# Patient Record
Sex: MALE | Race: WHITE | NOT HISPANIC OR LATINO | Employment: OTHER | URBAN - METROPOLITAN AREA
[De-identification: names, ages, dates, MRNs, and addresses within clinical notes are randomized per-mention and may not be internally consistent; named-entity substitution may affect disease eponyms.]

---

## 2018-12-12 ENCOUNTER — TRANSFERRED RECORDS (OUTPATIENT)
Dept: HEALTH INFORMATION MANAGEMENT | Facility: CLINIC | Age: 63
End: 2018-12-12

## 2019-09-21 ENCOUNTER — TRANSFERRED RECORDS (OUTPATIENT)
Dept: HEALTH INFORMATION MANAGEMENT | Facility: CLINIC | Age: 64
End: 2019-09-21

## 2019-10-10 ENCOUNTER — TRANSFERRED RECORDS (OUTPATIENT)
Dept: HEALTH INFORMATION MANAGEMENT | Facility: CLINIC | Age: 64
End: 2019-10-10

## 2019-10-16 ENCOUNTER — TRANSFERRED RECORDS (OUTPATIENT)
Dept: HEALTH INFORMATION MANAGEMENT | Facility: CLINIC | Age: 64
End: 2019-10-16

## 2019-10-17 ENCOUNTER — TRANSFERRED RECORDS (OUTPATIENT)
Dept: HEALTH INFORMATION MANAGEMENT | Facility: CLINIC | Age: 64
End: 2019-10-17

## 2019-10-25 ENCOUNTER — TRANSFERRED RECORDS (OUTPATIENT)
Dept: HEALTH INFORMATION MANAGEMENT | Facility: CLINIC | Age: 64
End: 2019-10-25

## 2019-11-01 ENCOUNTER — TRANSFERRED RECORDS (OUTPATIENT)
Dept: HEALTH INFORMATION MANAGEMENT | Facility: CLINIC | Age: 64
End: 2019-11-01

## 2019-11-08 ENCOUNTER — TRANSFERRED RECORDS (OUTPATIENT)
Dept: HEALTH INFORMATION MANAGEMENT | Facility: CLINIC | Age: 64
End: 2019-11-08

## 2019-12-13 ENCOUNTER — TRANSFERRED RECORDS (OUTPATIENT)
Dept: HEALTH INFORMATION MANAGEMENT | Facility: CLINIC | Age: 64
End: 2019-12-13

## 2020-03-03 ENCOUNTER — TRANSFERRED RECORDS (OUTPATIENT)
Dept: HEALTH INFORMATION MANAGEMENT | Facility: CLINIC | Age: 65
End: 2020-03-03

## 2020-06-05 ENCOUNTER — TRANSFERRED RECORDS (OUTPATIENT)
Dept: HEALTH INFORMATION MANAGEMENT | Facility: CLINIC | Age: 65
End: 2020-06-05

## 2020-06-16 ENCOUNTER — TRANSFERRED RECORDS (OUTPATIENT)
Dept: HEALTH INFORMATION MANAGEMENT | Facility: CLINIC | Age: 65
End: 2020-06-16

## 2020-07-15 ENCOUNTER — TRANSFERRED RECORDS (OUTPATIENT)
Dept: HEALTH INFORMATION MANAGEMENT | Facility: CLINIC | Age: 65
End: 2020-07-15

## 2020-10-16 ENCOUNTER — TRANSFERRED RECORDS (OUTPATIENT)
Dept: HEALTH INFORMATION MANAGEMENT | Facility: CLINIC | Age: 65
End: 2020-10-16

## 2020-11-09 ENCOUNTER — TRANSFERRED RECORDS (OUTPATIENT)
Dept: HEALTH INFORMATION MANAGEMENT | Facility: CLINIC | Age: 65
End: 2020-11-09

## 2020-11-14 ENCOUNTER — TRANSFERRED RECORDS (OUTPATIENT)
Dept: HEALTH INFORMATION MANAGEMENT | Facility: CLINIC | Age: 65
End: 2020-11-14

## 2020-11-16 ENCOUNTER — TRANSFERRED RECORDS (OUTPATIENT)
Dept: HEALTH INFORMATION MANAGEMENT | Facility: CLINIC | Age: 65
End: 2020-11-16

## 2021-02-01 ENCOUNTER — TRANSFERRED RECORDS (OUTPATIENT)
Dept: HEALTH INFORMATION MANAGEMENT | Facility: CLINIC | Age: 66
End: 2021-02-01

## 2021-02-02 ENCOUNTER — TRANSFERRED RECORDS (OUTPATIENT)
Dept: HEALTH INFORMATION MANAGEMENT | Facility: CLINIC | Age: 66
End: 2021-02-02

## 2021-02-03 ENCOUNTER — TRANSFERRED RECORDS (OUTPATIENT)
Dept: HEALTH INFORMATION MANAGEMENT | Facility: CLINIC | Age: 66
End: 2021-02-03

## 2021-02-04 ENCOUNTER — TRANSFERRED RECORDS (OUTPATIENT)
Dept: HEALTH INFORMATION MANAGEMENT | Facility: CLINIC | Age: 66
End: 2021-02-04

## 2021-02-05 ENCOUNTER — TRANSFERRED RECORDS (OUTPATIENT)
Dept: HEALTH INFORMATION MANAGEMENT | Facility: CLINIC | Age: 66
End: 2021-02-05

## 2021-02-16 ENCOUNTER — TRANSFERRED RECORDS (OUTPATIENT)
Dept: HEALTH INFORMATION MANAGEMENT | Facility: CLINIC | Age: 66
End: 2021-02-16

## 2021-04-29 ENCOUNTER — TRANSFERRED RECORDS (OUTPATIENT)
Dept: HEALTH INFORMATION MANAGEMENT | Facility: CLINIC | Age: 66
End: 2021-04-29

## 2021-05-17 ENCOUNTER — TRANSFERRED RECORDS (OUTPATIENT)
Dept: HEALTH INFORMATION MANAGEMENT | Facility: CLINIC | Age: 66
End: 2021-05-17

## 2021-07-27 ENCOUNTER — TRANSFERRED RECORDS (OUTPATIENT)
Dept: HEALTH INFORMATION MANAGEMENT | Facility: CLINIC | Age: 66
End: 2021-07-27

## 2021-08-05 ENCOUNTER — TRANSFERRED RECORDS (OUTPATIENT)
Dept: HEALTH INFORMATION MANAGEMENT | Facility: CLINIC | Age: 66
End: 2021-08-05

## 2021-09-13 ENCOUNTER — TRANSCRIBE ORDERS (OUTPATIENT)
Dept: OTHER | Age: 66
End: 2021-09-13

## 2021-09-13 DIAGNOSIS — N20.0 BILATERAL NEPHROLITHIASIS: Primary | ICD-10-CM

## 2021-09-13 DIAGNOSIS — N20.0 CALCULUS OF KIDNEY: ICD-10-CM

## 2021-09-13 DIAGNOSIS — N13.5 URETERAL STRICTURE: ICD-10-CM

## 2021-09-13 DIAGNOSIS — N13.5 CROSSING VESSEL AND STRICTURE OF URETER WITHOUT HYDRONEPHROSIS: ICD-10-CM

## 2021-10-05 ENCOUNTER — TELEPHONE (OUTPATIENT)
Dept: UROLOGY | Facility: CLINIC | Age: 66
End: 2021-10-05

## 2021-10-11 NOTE — TELEPHONE ENCOUNTER
MEDICAL RECORDS REQUEST   [facility]  Urology Clinic  [address] [place] [address]  PHONE: [phone]  Fax: [phone]        FUTURE VISIT INFORMATION                                                   Theodore Mnan, : 1955 scheduled for future visit at [facility]    APPOINTMENT INFORMATION:    Date: 2021    Provider:  Alexx Mtz MD    Reason for Visit/Diagnosis: biltateral nephrolithiasis, ureteral stricture, calculus of kidney, crossing vessel and stricture of ureter without hydronephrosis, records at [facility], scheduled per pt    REFERRAL INFORMATION:    Referring provider:  Mague Vargas    Specialty: n/a    Referring providers clinic:  [facility] contact number:  n/a    RECORDS REQUESTED FOR VISIT                                                     NOTES  STATUS/DETAILS   OFFICE NOTE from referring provider  Yes, records from [facility] in Media tab   OFFICE NOTE from other specialist  yes   DISCHARGE SUMMARY from hospital  no   DISCHARGE REPORT from the ER  no   OPERATIVE REPORT  yes   MEDICATION LIST  yes   LABS     URINALYSIS (UA)  yes   URINE CYTOLOGY  no   KIDNEY STONE     CT STONE  no   IMAGING (IMAGES & REPORT)  Received, 2019, 10/17/2019, 2019, 2020 -- CT ABD PEL   KUB  no   URETHRAL STRICTURE     RUG (IMAGES & REPORT)  no   VCUG  (IMAGES & REPORT)  no     PRE-VISIT CHECKLIST      Record collection complete If no, please explain   Request sent for recs and images -  amay    Appointment appropriately scheduled           (right time/right provider) Yes   Joint diagnostic appointment coordinated correctly          (ensure right order & amount of time) N/A   MyChart activation No   Questionnaire complete No     Records Requested  10/11/21    Facility  [place] medical records   Phone: [phone]  Fax: [phone]  Email: [email or web address]   Outcome Some recs in  Care  everywhere, sent a fax for images - Amay      Records Requested  10/11/21    Facility   [facility] [id number] [place] [address] [phone]     Med recs fx. Fax: [phone]  Med recs ph. [phone]     Outcome Some recs in  Care everywhere, sent a fax for images - Amay      Records Requested  10/11/21    Facility  [facility]  Medical Records  [address] [address]  Phone: [phone]  Fax: [phone]     Outcome Per appointment notes, patient has records at [facility], sent a fax for recs and images - Amay      Action 10/11/2021 JTV 9:29pm   Action Taken CSS received records from [facility]. Records sent to Scanning for processing.     @9:53pm, CSS [facility] Reports sent to scanning for processing. [facility] will send images via mail.      Action 10/12/2021 JTV 11:56am   Action Taken CSS received CD with images from [facility]. CD with images was sent to  for processing.     Action 10/27/2021 JTV 9:26am   Action Taken CSS resolved images pushed from [place] and [facility]. Previsit complete.

## 2021-11-01 ENCOUNTER — PRE VISIT (OUTPATIENT)
Dept: UROLOGY | Facility: CLINIC | Age: 66
End: 2021-11-01

## 2021-11-01 NOTE — TELEPHONE ENCOUNTER
Reason for visit: Ureteral stricture     Relevant information: kidney stones    Problem list There is no problem list on file for this patient.      Records/imaging/labs/orders: all records available    Pt called: no need for a call    At Rooming: standard

## 2021-11-08 ENCOUNTER — TELEPHONE (OUTPATIENT)
Dept: UROLOGY | Facility: CLINIC | Age: 66
End: 2021-11-08

## 2021-11-08 ENCOUNTER — OFFICE VISIT (OUTPATIENT)
Dept: UROLOGY | Facility: CLINIC | Age: 66
End: 2021-11-08
Payer: MEDICARE

## 2021-11-08 ENCOUNTER — PRE VISIT (OUTPATIENT)
Dept: UROLOGY | Facility: CLINIC | Age: 66
End: 2021-11-08

## 2021-11-08 ENCOUNTER — OFFICE VISIT (OUTPATIENT)
Dept: UROLOGY | Facility: CLINIC | Age: 66
End: 2021-11-08
Attending: UROLOGY
Payer: MEDICARE

## 2021-11-08 VITALS
WEIGHT: 243 LBS | DIASTOLIC BLOOD PRESSURE: 83 MMHG | HEIGHT: 71 IN | HEART RATE: 96 BPM | BODY MASS INDEX: 34.02 KG/M2 | SYSTOLIC BLOOD PRESSURE: 129 MMHG

## 2021-11-08 DIAGNOSIS — N13.5 STRICTURE OR KINKING OF URETER: ICD-10-CM

## 2021-11-08 DIAGNOSIS — N13.5 STRICTURE OR KINKING OF URETER: Primary | ICD-10-CM

## 2021-11-08 DIAGNOSIS — Z01.812 PRE-PROCEDURE LAB EXAM: Primary | ICD-10-CM

## 2021-11-08 LAB
ALBUMIN UR-MCNC: NEGATIVE MG/DL
APPEARANCE UR: CLEAR
BILIRUB UR QL STRIP: NEGATIVE
COLOR UR AUTO: YELLOW
GLUCOSE UR STRIP-MCNC: NEGATIVE MG/DL
HGB UR QL STRIP: NEGATIVE
KETONES UR STRIP-MCNC: NEGATIVE MG/DL
LEUKOCYTE ESTERASE UR QL STRIP: NEGATIVE
MUCOUS THREADS #/AREA URNS LPF: PRESENT /LPF
NITRATE UR QL: NEGATIVE
PH UR STRIP: 5 [PH] (ref 5–7)
RBC URINE: 1 /HPF
SP GR UR STRIP: 1.02 (ref 1–1.03)
UROBILINOGEN UR STRIP-MCNC: NORMAL MG/DL
WBC URINE: 2 /HPF

## 2021-11-08 PROCEDURE — 81001 URINALYSIS AUTO W/SCOPE: CPT | Performed by: PATHOLOGY

## 2021-11-08 PROCEDURE — 99205 OFFICE O/P NEW HI 60 MIN: CPT | Mod: GC | Performed by: UROLOGY

## 2021-11-08 PROCEDURE — 87086 URINE CULTURE/COLONY COUNT: CPT | Performed by: UROLOGY

## 2021-11-08 PROCEDURE — 99207 PR NO CHARGE NURSE ONLY: CPT

## 2021-11-08 RX ORDER — DULAGLUTIDE 4.5 MG/.5ML
INJECTION, SOLUTION SUBCUTANEOUS
COMMUNITY
Start: 2021-10-24

## 2021-11-08 RX ORDER — MONTELUKAST SODIUM 10 MG/1
TABLET ORAL
COMMUNITY
Start: 2021-09-18

## 2021-11-08 RX ORDER — LISINOPRIL 10 MG/1
TABLET ORAL
COMMUNITY
Start: 2021-08-18

## 2021-11-08 RX ORDER — CEFAZOLIN SODIUM 2 G/50ML
2 SOLUTION INTRAVENOUS
Status: CANCELLED | OUTPATIENT
Start: 2021-11-08

## 2021-11-08 RX ORDER — LEVOTHYROXINE SODIUM 150 UG/1
TABLET ORAL
COMMUNITY
Start: 2021-08-18

## 2021-11-08 RX ORDER — CEFAZOLIN SODIUM 2 G/50ML
2 SOLUTION INTRAVENOUS SEE ADMIN INSTRUCTIONS
Status: CANCELLED | OUTPATIENT
Start: 2021-11-08

## 2021-11-08 RX ORDER — DILTIAZEM HYDROCHLORIDE 120 MG/1
CAPSULE, EXTENDED RELEASE ORAL
COMMUNITY
Start: 2021-11-05

## 2021-11-08 RX ORDER — ROSUVASTATIN CALCIUM 10 MG/1
TABLET, COATED ORAL
COMMUNITY
Start: 2021-10-18

## 2021-11-08 RX ORDER — DULOXETIN HYDROCHLORIDE 60 MG/1
60 CAPSULE, DELAYED RELEASE ORAL
COMMUNITY
Start: 2020-11-02

## 2021-11-08 RX ORDER — DILTIAZEM HYDROCHLORIDE 240 MG/1
240 CAPSULE, EXTENDED RELEASE ORAL
COMMUNITY
Start: 2020-11-16

## 2021-11-08 ASSESSMENT — PAIN SCALES - GENERAL: PAINLEVEL: NO PAIN (0)

## 2021-11-08 ASSESSMENT — MIFFLIN-ST. JEOR: SCORE: 1904.37

## 2021-11-08 NOTE — PROGRESS NOTES
HPI:  66M with a hx of DM2 (recently poorly-controlled), CKD IIIb, GERD, HTN, HLD, SHERRON, obesity s/p sleeve gastrectomy (2013)  (current BMI = 34), and long-standing history of stones s/p L PCNL for impacted left proximal ureteral stone 07/2020 now with left ureteral stricture previously managed with a left PNT and now chronic left indwelling stent.     He has been seen by Calvin Horta at [facility] and reconstruction was discussed however he was noted to have prohibitively high blood sugars as well as SVT. HbA1c was 8.9 on 2/1/2021. Patient states that a more recent HbA1c was about 7. Dr. Horta thought U-U would be unlikely to succeed and that he would need an ileal ureter.     He's therefore been managed w/ a L ureteral stent for a year. Notes hematuria with activity. He wishes to become more active and feels the stent is hindering him from being as active as he would like. No pain at baseline.  Also notes incontinence if he's very active, mostly with bending/coughing. Hasn't had L PNT since February (was internalized). Denies UTIs since prior to his stone surgery.    He was then seen by endocrinology at [facility] 2/4/2021. Lifestyle modifications were discussed. Plan was for him to continue Metformin and his GLP-1 agonist.    2/3/21: Underwent cysto w/ RPG at [facility] (Calvin Horta). L retrograde revealed a  complete obliterating stricture in the proximal ureter at the level of L3/L4.     Last stent exchange was 8/5/2021 and he's been doing this in [place].     [facility]  Gabriel-Parkinson-White syndrome, s/p ablation (04/2021)  NIDDM  HTN  Hypothyroid  Kidney stones  Ureteral stricture  Difficult intubation  Hyperchol  SHERRON on CPAP    PSH:  Bariatric surgery 2015  Trigger finger release  Carpal tunnel release    FH:  Brother with bladder cancer    SH:  Never smoker but was a  and was exposed to 2nd hand smoke  No alcohol since college        Exam:  BP  "129/83   Pulse 96   Ht 1.803 m (5' 11\")   Wt 110.2 kg (243 lb)   BMI 33.89 kg/m    General: age-appropriate appearing male in NAD sitting in an exam chair  HEENT: Head AT/NC, EOMI, CN Grossly intact.  Resp: no respiratory distress  CV: heart rate regular  Skin: clear of rashes or ecchymoses.   Motor: Moves all extremities, walks  Abdomen: moderately obese. No abdominal incisional scars.     Review of Imaging:  IR nephrostogram 2/2/21 ([place]):  IMPRESSION:   Left nephrostogram demonstrates left ureteral stricture just distal   to the UPJ with no flow distally.     RPG 2/2021 shows distal ureter to be atretic from about L5 up. In total this means there is only about 2 cm of normal ureter on the upper end and 3-4 on the lower end.     CTU 2/1/21 ([place]):  1. Left nephrostomy tube.   2. No extravasation of intravenous contrast material.   3. No passage of contrast material beyond the 1st several centimeters of the   proximal left ureter consistent with obstruction. Irregular soft tissue about   the site of obstruction likely due to scarring. Calcification/calculi in the   distal left ureter without upstream dilatation.   4. 6 mm nodule in the right lung base is unchanged compared to 10/25/2019.    5. Per my interpretation the left kidney has a moderate amount of parenchyma. The upper pole has more parenchyma than the lower pole. There is a bifid collecting system with junction in the renal pelvic. Renal pelvis is a bit intrarenal.     NM Kidney with Lasix 2/1/21 ([place]):  1. Obstruction of the left collecting system, which does not clear with Lasix   administration. Left-sided hydronephrosis.   2. Otherwise, normal renal function bilaterally.   3. Split renal function is 54% right kidney 46% left kidney      Review of Labs:  The following labs were reviewed by me and discussed with the patient:  A1c 2/1/2021: 8.9 (H)  BMP 6/8/21: Cr 1.72, GFR 40, glucose 130      Assessment & Plan    66 y.o. man w/ left ureteral " stricture of unclear length - estimated to be approximately 6 cm on available retrograde and antegrade imaging. We discussed options of management including ongoing indwelling ureteral stent vs. Surgical management. He is strongly opposed to ongoing stent management as he feels this is significantly reducing his quality of life. Surgical options would include ileal ureter vs. Autotransplant. He would be a poor candidate for buccal ureteroplasty given the obliterative nature of his stricture based on available imaging and stricture appears too long for ureteroureterostomy. We discussed that he would be at increased risk for perioperative complications given his numerous medical comorbidities, including DM2 with most recent A1c of 8.9.     Given his risk, we discussed that we would not offer surgery currently. We would prefer to wait until he lost 25 lbs which would make bowel harvesting safer --   We find that the obese tught mesentery obligates us to take longer segments of bowel with resultant  yo-yo voiding. . After that, we would want to repeat antegrade/retrograde imaging to establish the full length of the stricture. Based on the info we have, we discussed ileal ureter although he seems to have a small intrarenal pelvis on recent CTU. Ileocalycostomy may be an option but we will determine the best course of action after his follow-up imaging after weight loss.    This would be a very difficult ileal ureter. Discussed that ileal ureter could become ileocalicostomy if the pelvis is hard to access d/t its small size, intrarenal location and the kathy-renal scarring from the extravasation. Could result in renal loss or significant blood loss at the hilum.     1. Weight loss as above  2. He will each out after recommended weight loss and we will arrange for retrograde and antegrade imaging of his left ureter to better classify the stricture  3. He will continue with his routine stent exchanges in [place] in the  meantime  4. Strict glycemic control encouraged    Adolfo Mark MD  Urology PGY-4     =======================================================  As the attending surgeon I, Alexx Mtz, interviewed and examined the patient. The plan was developed between me and the patient. My findings and plan are as stated by the resident      Alexx Mtz MD  [facility] [place]      ==========================      Additional Coding Information:    Problems:  4 -- one or more chronic illnesses with exacerbation or side effects    Data Reviewed  Review of the result(s) of each unique test - Hb  A1c, Cr    Tests ordered: none    Independent interpretation of a test performed by another physician/other qualified health care professional (not separately reported) - CT and RPGs and Lasix renogram      Level of risk:  5 -- major surgery with patient or procedure risks    Time spent:  45 minutes spent on the date of the encounter doing chart review, history and exam, documentation and further activities per the note

## 2021-11-08 NOTE — NURSING NOTE
Pre Op Teaching Flowsheet                                        Pre and Post op Patient Education  Relevant Diagnosis: urethra stricture  Teaching Topic:  Pre and post op teaching for cystoscope with urethrogram and stent placement  Person Involved in teaching: patient     Motivation Level: High  Asks Questions: Yes  Eager to Learn:  Yes  Cooperative: Yes  Receptive (willing/able to accept information):  Yes  Patient demonstrates understanding of the following:  Date and time of surgery: 11-19-21   Location of surgery: [facility]  History and Physical and any other testing necessary prior to surgery Pre Op Physical with: PCP on TB Scheduled ASAP by patient  Required time line for completion of History and Physical and any pre-op testing: No more than 30 days prior to surgery date    NPO Guidelines: NPO per Anesthesia Guidelines : Reviewed (surgery packet for further information).    Patient demonstrates understanding of the following:  Patient understands the need for a responsible adult to drive them home and someone to stay with them for the first 24 hours post-operatively: Wife  Pre-op bowel prep: N/A  Pre-op showering/scrub information with Hibiclens Soap: Yes  Medications to take the day of surgery: Pre op Physical for instruction.   Blood thinner medications discussed and when to stop (if applicable):  Yes  Diabetes medication management (if applicable):  Patient to discuss with Primary Care Provider  Discussed pain control after surgery: pain scale, pain medications and pain management techniques  Infection Prevention: Patient demonstrates understanding of the following:  Patient instructed on hand hygiene:  Yes  Surgical procedure site care taught: Yes  Signs and symptoms of infection taught:  Yes  Wound care will be taught at the time of discharge.    Urine anylisis and Urine Culture ordered on:11/08/21  Pre op Covid testing on: 11/15/21 [place]  Post-op follow-up: as directed  Discussed how to contact the  hospital, nurse, and clinic scheduling staff if necessary.     Surgical instructions given to patient in clinic:Yes.   Instructional materials used/given/mailed: Before your surgery packet , Medications to avoid before surgery , Showering or Bathing instructions before surgery  and What to expect after surgery    Total time with patient: 10 minutes  Mariluz Sanchez RN

## 2021-11-08 NOTE — NURSING NOTE
"Chief Complaint   Patient presents with     Consult     ureteral stricture - history of kidney stone \"from hell\"       Blood pressure 129/83, pulse 96, height 1.803 m (5' 11\"), weight 110.2 kg (243 lb). Body mass index is 33.89 kg/m .    There is no problem list on file for this patient.      Allergies   Allergen Reactions     Atorvastatin Other (See Comments)     muscle ache  muscle ache         Current Outpatient Medications   Medication Sig Dispense Refill     amitriptyline (ELAVIL) 25 MG tablet        cyanocobalamin 1000 MCG SUBL Place 1,000 mcg under the tongue       diltiazem ER (DILT-XR) 240 MG 24 hr ER beaded capsule Take 240 mg by mouth       diltiazem ER (TIAZAC) 120 MG 24 hr ER beaded capsule        DULoxetine (CYMBALTA) 60 MG capsule Take 60 mg by mouth       levothyroxine (SYNTHROID/LEVOTHROID) 150 MCG tablet        lisinopril (ZESTRIL) 10 MG tablet        metFORMIN (GLUCOPHAGE) 1000 MG tablet        montelukast (SINGULAIR) 10 MG tablet        rosuvastatin (CRESTOR) 10 MG tablet        TRULICITY 4.5 MG/0.5ML SOPN INJECT 4.5 MG UNDER THE SKIN ONCE WEEKLY         Social History     Tobacco Use     Smoking status: Never Smoker     Smokeless tobacco: Never Used   Substance Use Topics     Alcohol use: None     Drug use: None       Carolyn Bernal CMA  11/8/2021  11:47 AM     "

## 2021-11-09 LAB — BACTERIA UR CULT: NO GROWTH

## 2021-11-18 ENCOUNTER — ANESTHESIA EVENT (OUTPATIENT)
Dept: SURGERY | Facility: AMBULATORY SURGERY CENTER | Age: 66
End: 2021-11-18
Payer: MEDICARE

## 2021-11-19 ENCOUNTER — ANESTHESIA (OUTPATIENT)
Dept: SURGERY | Facility: AMBULATORY SURGERY CENTER | Age: 66
End: 2021-11-19
Payer: MEDICARE

## 2021-11-19 ENCOUNTER — ANCILLARY PROCEDURE (OUTPATIENT)
Dept: RADIOLOGY | Facility: AMBULATORY SURGERY CENTER | Age: 66
End: 2021-11-19
Attending: UROLOGY
Payer: MEDICARE

## 2021-11-19 ENCOUNTER — HOSPITAL ENCOUNTER (OUTPATIENT)
Facility: AMBULATORY SURGERY CENTER | Age: 66
End: 2021-11-19
Attending: STUDENT IN AN ORGANIZED HEALTH CARE EDUCATION/TRAINING PROGRAM
Payer: MEDICARE

## 2021-11-19 VITALS
SYSTOLIC BLOOD PRESSURE: 108 MMHG | WEIGHT: 241 LBS | HEART RATE: 88 BPM | OXYGEN SATURATION: 92 % | HEIGHT: 71 IN | DIASTOLIC BLOOD PRESSURE: 64 MMHG | RESPIRATION RATE: 18 BRPM | TEMPERATURE: 97.5 F | BODY MASS INDEX: 33.74 KG/M2

## 2021-11-19 DIAGNOSIS — N13.5 STRICTURE OR KINKING OF URETER: ICD-10-CM

## 2021-11-19 DIAGNOSIS — R52 PAIN: ICD-10-CM

## 2021-11-19 LAB
GLUCOSE BLDC GLUCOMTR-MCNC: 133 MG/DL (ref 70–99)
GLUCOSE BLDC GLUCOMTR-MCNC: 135 MG/DL (ref 70–99)

## 2021-11-19 PROCEDURE — 74420 UROGRAPHY RTRGR +-KUB: CPT | Mod: 26 | Performed by: UROLOGY

## 2021-11-19 PROCEDURE — 52332 CYSTOSCOPY AND TREATMENT: CPT | Mod: LT | Performed by: UROLOGY

## 2021-11-19 PROCEDURE — 74420 UROGRAPHY RTRGR +-KUB: CPT | Mod: TC

## 2021-11-19 PROCEDURE — 51600 INJECTION FOR BLADDER X-RAY: CPT | Mod: GC | Performed by: UROLOGY

## 2021-11-19 PROCEDURE — 51600 INJECTION FOR BLADDER X-RAY: CPT

## 2021-11-19 PROCEDURE — 82962 GLUCOSE BLOOD TEST: CPT | Performed by: PATHOLOGY

## 2021-11-19 PROCEDURE — 52332 CYSTOSCOPY AND TREATMENT: CPT | Mod: LT

## 2021-11-19 DEVICE — STENT URETERAL PERCUFLEX PLUS 6FRX26CM M0061752630: Type: IMPLANTABLE DEVICE | Site: URETER | Status: FUNCTIONAL

## 2021-11-19 RX ORDER — CEFAZOLIN SODIUM 2 G/50ML
2 SOLUTION INTRAVENOUS
Status: COMPLETED | OUTPATIENT
Start: 2021-11-19 | End: 2021-11-19

## 2021-11-19 RX ORDER — SODIUM CHLORIDE, SODIUM LACTATE, POTASSIUM CHLORIDE, CALCIUM CHLORIDE 600; 310; 30; 20 MG/100ML; MG/100ML; MG/100ML; MG/100ML
INJECTION, SOLUTION INTRAVENOUS CONTINUOUS
Status: DISCONTINUED | OUTPATIENT
Start: 2021-11-19 | End: 2021-11-19 | Stop reason: HOSPADM

## 2021-11-19 RX ORDER — HYDROCODONE BITARTRATE AND ACETAMINOPHEN 5; 325 MG/1; MG/1
1 TABLET ORAL
Status: DISCONTINUED | OUTPATIENT
Start: 2021-11-19 | End: 2021-11-20 | Stop reason: HOSPADM

## 2021-11-19 RX ORDER — DEXAMETHASONE SODIUM PHOSPHATE 4 MG/ML
INJECTION, SOLUTION INTRA-ARTICULAR; INTRALESIONAL; INTRAMUSCULAR; INTRAVENOUS; SOFT TISSUE PRN
Status: DISCONTINUED | OUTPATIENT
Start: 2021-11-19 | End: 2021-11-19

## 2021-11-19 RX ORDER — SODIUM CHLORIDE, SODIUM LACTATE, POTASSIUM CHLORIDE, CALCIUM CHLORIDE 600; 310; 30; 20 MG/100ML; MG/100ML; MG/100ML; MG/100ML
INJECTION, SOLUTION INTRAVENOUS CONTINUOUS
Status: DISCONTINUED | OUTPATIENT
Start: 2021-11-19 | End: 2021-11-20 | Stop reason: HOSPADM

## 2021-11-19 RX ORDER — LIDOCAINE HYDROCHLORIDE 20 MG/ML
INJECTION, SOLUTION INFILTRATION; PERINEURAL PRN
Status: DISCONTINUED | OUTPATIENT
Start: 2021-11-19 | End: 2021-11-19

## 2021-11-19 RX ORDER — OXYCODONE HYDROCHLORIDE 5 MG/1
5 TABLET ORAL EVERY 4 HOURS PRN
Status: DISCONTINUED | OUTPATIENT
Start: 2021-11-19 | End: 2021-11-20 | Stop reason: HOSPADM

## 2021-11-19 RX ORDER — PROPOFOL 10 MG/ML
INJECTION, EMULSION INTRAVENOUS PRN
Status: DISCONTINUED | OUTPATIENT
Start: 2021-11-19 | End: 2021-11-19

## 2021-11-19 RX ORDER — LIDOCAINE 40 MG/G
CREAM TOPICAL
Status: DISCONTINUED | OUTPATIENT
Start: 2021-11-19 | End: 2021-11-19 | Stop reason: HOSPADM

## 2021-11-19 RX ORDER — PROPOFOL 10 MG/ML
INJECTION, EMULSION INTRAVENOUS CONTINUOUS PRN
Status: DISCONTINUED | OUTPATIENT
Start: 2021-11-19 | End: 2021-11-19

## 2021-11-19 RX ORDER — MEPERIDINE HYDROCHLORIDE 25 MG/ML
12.5 INJECTION INTRAMUSCULAR; INTRAVENOUS; SUBCUTANEOUS
Status: DISCONTINUED | OUTPATIENT
Start: 2021-11-19 | End: 2021-11-20 | Stop reason: HOSPADM

## 2021-11-19 RX ORDER — FENTANYL CITRATE 50 UG/ML
25 INJECTION, SOLUTION INTRAMUSCULAR; INTRAVENOUS EVERY 5 MIN PRN
Status: DISCONTINUED | OUTPATIENT
Start: 2021-11-19 | End: 2021-11-19 | Stop reason: HOSPADM

## 2021-11-19 RX ORDER — GABAPENTIN 100 MG/1
100 CAPSULE ORAL
Status: COMPLETED | OUTPATIENT
Start: 2021-11-19 | End: 2021-11-19

## 2021-11-19 RX ORDER — ACETAMINOPHEN 325 MG/1
975 TABLET ORAL ONCE
Status: COMPLETED | OUTPATIENT
Start: 2021-11-19 | End: 2021-11-19

## 2021-11-19 RX ORDER — ATROPA BELLADONNA AND OPIUM 16.2; 3 MG/1; MG/1
1 SUPPOSITORY RECTAL
Status: DISCONTINUED | OUTPATIENT
Start: 2021-11-19 | End: 2021-11-20 | Stop reason: HOSPADM

## 2021-11-19 RX ORDER — ONDANSETRON 4 MG/1
4 TABLET, ORALLY DISINTEGRATING ORAL EVERY 30 MIN PRN
Status: DISCONTINUED | OUTPATIENT
Start: 2021-11-19 | End: 2021-11-20 | Stop reason: HOSPADM

## 2021-11-19 RX ORDER — ONDANSETRON 2 MG/ML
4 INJECTION INTRAMUSCULAR; INTRAVENOUS EVERY 30 MIN PRN
Status: DISCONTINUED | OUTPATIENT
Start: 2021-11-19 | End: 2021-11-20 | Stop reason: HOSPADM

## 2021-11-19 RX ORDER — CEFAZOLIN SODIUM 2 G/50ML
2 SOLUTION INTRAVENOUS SEE ADMIN INSTRUCTIONS
Status: DISCONTINUED | OUTPATIENT
Start: 2021-11-19 | End: 2021-11-19 | Stop reason: HOSPADM

## 2021-11-19 RX ORDER — ONDANSETRON 2 MG/ML
INJECTION INTRAMUSCULAR; INTRAVENOUS PRN
Status: DISCONTINUED | OUTPATIENT
Start: 2021-11-19 | End: 2021-11-19

## 2021-11-19 RX ORDER — TOLTERODINE TARTRATE 2 MG/1
2 TABLET, EXTENDED RELEASE ORAL EVERY 12 HOURS PRN
Qty: 20 TABLET | Refills: 0 | Status: SHIPPED | OUTPATIENT
Start: 2021-11-19 | End: 2021-11-29

## 2021-11-19 RX ORDER — FENTANYL CITRATE 50 UG/ML
INJECTION, SOLUTION INTRAMUSCULAR; INTRAVENOUS PRN
Status: DISCONTINUED | OUTPATIENT
Start: 2021-11-19 | End: 2021-11-19

## 2021-11-19 RX ADMIN — ACETAMINOPHEN 975 MG: 325 TABLET ORAL at 08:27

## 2021-11-19 RX ADMIN — PROPOFOL 150 MCG/KG/MIN: 10 INJECTION, EMULSION INTRAVENOUS at 09:03

## 2021-11-19 RX ADMIN — PROPOFOL 50 MG: 10 INJECTION, EMULSION INTRAVENOUS at 09:03

## 2021-11-19 RX ADMIN — LIDOCAINE HYDROCHLORIDE 100 MG: 20 INJECTION, SOLUTION INFILTRATION; PERINEURAL at 09:03

## 2021-11-19 RX ADMIN — DEXAMETHASONE SODIUM PHOSPHATE 4 MG: 4 INJECTION, SOLUTION INTRA-ARTICULAR; INTRALESIONAL; INTRAMUSCULAR; INTRAVENOUS; SOFT TISSUE at 09:13

## 2021-11-19 RX ADMIN — ONDANSETRON 4 MG: 2 INJECTION INTRAMUSCULAR; INTRAVENOUS at 09:13

## 2021-11-19 RX ADMIN — FENTANYL CITRATE 25 MCG: 50 INJECTION, SOLUTION INTRAMUSCULAR; INTRAVENOUS at 09:20

## 2021-11-19 RX ADMIN — SODIUM CHLORIDE, SODIUM LACTATE, POTASSIUM CHLORIDE, CALCIUM CHLORIDE: 600; 310; 30; 20 INJECTION, SOLUTION INTRAVENOUS at 09:01

## 2021-11-19 RX ADMIN — GABAPENTIN 100 MG: 100 CAPSULE ORAL at 08:27

## 2021-11-19 RX ADMIN — CEFAZOLIN SODIUM 2 G: 2 SOLUTION INTRAVENOUS at 09:07

## 2021-11-19 ASSESSMENT — MIFFLIN-ST. JEOR: SCORE: 1895.3

## 2021-11-19 ASSESSMENT — ENCOUNTER SYMPTOMS: DYSRHYTHMIAS: 0

## 2021-11-19 NOTE — ANESTHESIA POSTPROCEDURE EVALUATION
Patient: Theodore Mann    Procedure: Procedure(s):  CYSTOSCOPY, WITH RETROGRADE PYELOGRAM, URETERAL STENT REPLACEMENT, cystogram and cystolithopaxy       Diagnosis:Stricture or kinking of ureter [N13.5]  Diagnosis Additional Information: No value filed.    Anesthesia Type:  MAC    Note:  Disposition: Outpatient   Postop Pain Control: Uneventful            Sign Out: Well controlled pain   PONV: No   Neuro/Psych: Uneventful            Sign Out: Acceptable/Baseline neuro status   Airway/Respiratory: Uneventful            Sign Out: Acceptable/Baseline resp. status   CV/Hemodynamics: Uneventful            Sign Out: Acceptable CV status; No obvious hypovolemia; No obvious fluid overload   Other NRE: NONE   DID A NON-ROUTINE EVENT OCCUR? No           Last vitals:  Vitals Value Taken Time   /66 11/19/21 1015   Temp 35.9  C (96.6  F) 11/19/21 0957   Pulse 92 11/19/21 1015   Resp 17 11/19/21 1015   SpO2 92 % 11/19/21 0957       Electronically Signed By: Joshua Belle MD  November 19, 2021  11:17 AM

## 2021-11-19 NOTE — DISCHARGE INSTRUCTIONS
[facility]  Home Care Following Anesthesia  For 24 hours after surgery:  1. Get plenty of rest.  A responsible adult must stay with you for at least 24 hours after you leave the surgery center.  2. Do not drive or use heavy equipment.  If you have weakness or tingling, don't drive or use heavy equipment until this feeling goes away.   3. Do not drink alcohol.   4. Avoid strenuous or risky activities.  Ask for help when climbing stairs.  5. You may feel lightheaded.  IF so, sit for a few minutes before standing.  Have someone help you get up.   6. If you have nausea (feel sick to your stomach): Drink only clear liquids such as apple juice, ginger ale, broth or 7-Up.  Rest may also help.  Be sure to drink enough fluids.  Move to a regular diet as you feel able.   7. You may have a slight fever.  Call the doctor if your fever is over 100 F (37.7 C) (taken under the tongue) or lasts longer than 24 hours.  8. You may have a dry mouth, a sore throat, muscle aches or trouble sleeping. These should go away after 24 hours.  9. Do not make important or legal decisions.   10. It is recommended to avoid smoking.   If you use hormonal birth control (such as the pill, patch, ring or implants):  You will need a second form of birth control for 7 days (condoms, a diaphragm or contraceptive foam).  While in the surgery center, you received a medicine called Sugammadex.  Hormonal birth control (such as the pill, patch, ring or implants) will not work as well for a week after taking this medicine.         Tips for taking pain medications  To get the best pain relief possible, remember these points:    Take pain medications as directed, before pain becomes severe.    Pain medication can upset your stomach: taking it with food may help.    Constipation is a common side effect of pain medication. Drink plenty of  fluids.    Eat foods high in fiber. Take a stool softener if recommended by your doctor  or pharmacist.    Do not drink alcohol, drive or operate machinery while taking pain medications.    Ask about other ways to control pain, such as with heat, ice or relaxation.    Tylenol/Acetaminophen Consumption  To help encourage the safe use of acetaminophen, the makers of TYLENOL  have lowered the maximum daily dose for single-ingredient Extra Strength TYLENOL  (acetaminophen) products sold in the [place] from 8 pills per day (4,000 mg) to 6 pills per day (3,000 mg). The dosing interval has also changed from 2 pills every 4-6 hours to 2 pills every 6 hours.    If you feel your pain relief is insufficient, you may take Tylenol/Acetaminophen in addition to your narcotic pain medication.     Be careful not to exceed 3,000 mg of Tylenol/Acetaminophen in a 24 hour period from all sources.    If you are taking extra strength Tylenol/acetaminophen (500 mg), the maximum dose is 6 tablets in 24 hours.    If you are taking regular strength acetaminophen (325 mg), the maximum dose is 9 tablets in 24 hours.    Call a doctor for any of the followin. Signs of infection (fever, growing tenderness at the surgery site, a large amount of drainage or bleeding, severe pain, foul-smelling drainage, redness, swelling).  2. It has been over 8 to 10 hours since surgery and you are still not able to urinate (pass water).  3. Headache for over 24 hours.  4. Numbness, tingling or weakness the day after surgery (if you had spinal anesthesia).  5. Signs of Covid-19 infection (temperature over 100 degrees, shortness of breath, cough, loss of taste/smell, generalized body aches, persistent headache, chills, sore throat, nausea/vomiting/diarrhea)  Your doctor is:  Dr. Cindy Sampson, Prostate and Urology: [phone]  Or dial [phone] and ask for the resident on call for:  Prostate Urology  For emergency care, call the:  [facility]:  [phone] (TTY for hearing impaired: [phone])

## 2021-11-19 NOTE — ANESTHESIA PREPROCEDURE EVALUATION
Anesthesia Pre-Procedure Evaluation    Patient: Theodore Mann   MRN: [id number] : 1955        Preoperative Diagnosis: Stricture or kinking of ureter [N13.5]    Procedure : Procedure(s):  CYSTOSCOPY, WITH RETROGRADE PYELOGRAM AND URETERAL STENT REPLACEMENT          No past medical history on file.   No past surgical history on file.   Allergies   Allergen Reactions     Atorvastatin Other (See Comments)     muscle ache  muscle ache        Social History     Tobacco Use     Smoking status: Never Smoker     Smokeless tobacco: Never Used   Substance Use Topics     Alcohol use: Not on file      Wt Readings from Last 1 Encounters:   21 109.3 kg (241 lb)        Anesthesia Evaluation   Pt has had prior anesthetic. Type: General.    No history of anesthetic complications       ROS/MED HX  ENT/Pulmonary:     (+) sleep apnea, uses CPAP,  (-) asthma   Neurologic:  - neg neurologic ROS     Cardiovascular:    (-) hypertension, arrhythmias and murmur   METS/Exercise Tolerance: >4 METS    Hematologic:  - neg hematologic  ROS     Musculoskeletal:  - neg musculoskeletal ROS     GI/Hepatic:     (+) GERD, Asymptomatic on medication,  (-) liver disease   Renal/Genitourinary:    (-) renal disease   Endo:     (+) Obesity,  (-) Type II DM   Psychiatric/Substance Use:  - neg psychiatric ROS     Infectious Disease:  - neg infectious disease ROS  (-) Recent Fever   Malignancy:    (-) malignancy   Other:            Physical Exam    Airway        Mallampati: II   TM distance: > 3 FB   Neck ROM: full   Mouth opening: > 3 cm    Respiratory Devices and Support         Dental  no notable dental history         Cardiovascular          Rhythm and rate: regular and normal (-) no murmur    Pulmonary   pulmonary exam normal        breath sounds clear to auscultation           OUTSIDE LABS:  CBC: No results found for: WBC, HGB, HCT, PLT  BMP:   Lab Results   Component Value Date     (H) 2021     COAGS: No results found for:  PTT, INR, FIBR  POC: No results found for: BGM, HCG, HCGS  HEPATIC: No results found for: ALBUMIN, PROTTOTAL, ALT, AST, GGT, ALKPHOS, BILITOTAL, BILIDIRECT, GARCIA  OTHER: No results found for: PH, LACT, A1C, KENDRICK, PHOS, MAG, LIPASE, AMYLASE, TSH, T4, T3, CRP, SED    Anesthesia Plan    ASA Status:  2   NPO Status:  NPO Appropriate    Anesthesia Type: MAC.     - Reason for MAC: chronic cardiopulmonary disease, immobility needed   Induction: Propofol.   Maintenance: TIVA.        Consents    Anesthesia Plan(s) and associated risks, benefits, and realistic alternatives discussed. Questions answered and patient/representative(s) expressed understanding.     - Discussed: Risks, Benefits and Alternatives for BOTH SEDATION and the PROCEDURE were discussed     - Discussed with:  Patient      - Specific Concerns: Conversion to general with associated airway management.     - Extended Intubation/Ventilatory Support Discussed: No.      - Patient is DNR/DNI Status: No    Use of blood products discussed: No .     Postoperative Care    Pain management: IV analgesics, Oral pain medications, Multi-modal analgesia.   PONV prophylaxis: Background Propofol Infusion, Ondansetron (or other 5HT-3)     Comments:                Joshua Belle MD

## 2021-11-19 NOTE — ANESTHESIA CARE TRANSFER NOTE
Patient: Theodore Mann    Procedure: Procedure(s):  CYSTOSCOPY, WITH RETROGRADE PYELOGRAM AND URETERAL STENT REPLACEMENT       Diagnosis: Stricture or kinking of ureter [N13.5]  Diagnosis Additional Information: No value filed.    Anesthesia Type:   MAC     Note:    Oropharynx: spontaneously breathing and oropharynx clear of all foreign objects  Level of Consciousness: awake  Oxygen Supplementation: room air    Independent Airway: airway patency satisfactory and stable  Dentition: dentition unchanged  Vital Signs Stable: post-procedure vital signs reviewed and stable  Report to RN Given: handoff report given  Patient transferred to: Phase II    Handoff Report: Identifed the Patient, Identified the Reponsible Provider, Reviewed the pertinent medical history, Discussed the surgical course, Reviewed Intra-OP anesthesia mangement and issues during anesthesia, Set expectations for post-procedure period and Allowed opportunity for questions and acknowledgement of understanding      Vitals:  Vitals Value Taken Time   /85    Temp     Pulse 72    Resp 20    SpO2 94        Electronically Signed By: TIFFANY Martínez CRNA  November 19, 2021  9:48 AM

## 2021-11-19 NOTE — BRIEF OP NOTE
[facility]    Brief Operative Note    Pre-operative diagnosis: Stricture or kinking of ureter [N13.5]  Post-operative diagnosis Same as pre-operative diagnosis    Procedure: Procedure(s):  CYSTOSCOPY, WITH RETROGRADE PYELOGRAM, URETERAL STENT REPLACEMENT, cystogram and cystolithopaxy  Surgeon: Surgeon(s) and Role:     * Cindy Sampson MD - Primary     * Alexx Mtz MD  Anesthesia: Monitor Anesthesia Care   Estimated Blood Loss: None    Drains: As below  Specimens: * No specimens in log *  Findings:   Obliterated proximal left ureter. Highly hydronephrotic collecting system. 300 mL capacity bladder. .  Complications: None.  Implants:   Implant Name Type Inv. Item Serial No.  Lot No. LRB No. Used Action   STENT URETERAL PERCUFLEX PLUS [id number] - [id number] Stent STENT URETERAL PERCUFLEX PLUS [id number]  [employer] [id number] Left 1 Implanted

## 2021-11-19 NOTE — OP NOTE
OPERATIVE NOTE    PREOPERATIVE DIAGNOSIS:   Stricture or kinking of ureter [N13.5]    POSTOPERATIVE DIAGNOSIS:  Same    PROCEDURES PERFORMED:   Procedure(s):  1. CYSTOSCOPY, WITH RETROGRADE PYELOGRAM,  2. URETERAL STENT REPLACEMENT,  3. cystogram   4. cystolithopaxy    STAFF SURGEON:  Surgeon(s):  Alexx Mtz MD    Fellow: Cindy Sampson MD    RESIDENT(S):  Alex Rojas MD    ANESTHESIA:  Monitor Anesthesia Care    ESTIMATED BLOOD LOSS: 0 mL    IV FLUIDS: see dictated anesthesia record    COMPLICATIONS: None.     SPECIMEN:    * No specimens in log *    SIGNIFICANT FINDINGS:   Obliterated segment of proximal left ureter. 300 mL capacity bladder.     BRIEF OPERATIVE INDICATIONS: 66M with a hx of DM2 (recently poorly-controlled), CKD IIIb, GERD, HTN, HLD, SHERRON, obesity s/p sleeve gastrectomy (2013)  (current BMI = 34), and long-standing history of stones s/p L PCNL for impacted left proximal ureteral stone 07/2020 now with left ureteral stricture previously managed with a left PNT and now chronic left indwelling stent, last exchanged 8/5/2021 in [place]. He is here for left stent exchange and retrograde pyelogram for therapeutic purposes as well as further operative planning.     DESCRIPTION OF PROCEDURE: After full informed voluntary consent was obtained, the patient was transported to the operating room, placed supine on the table. After adequate anesthesia was induced, they were prepped and draped in the usual sterile fashion. A timeout was taken to confirm correct patient, procedure and laterality    A 22F rigid cystoscope was inserted into a well lubricated urethra. The urethra and prostate were unremarkable. The stent was seen coming from the left UO and was heavily encrusted with a rock-candy like appearance. A sensor wire was fed through the scope next to the stent in order to establish access to the left renal pelvis. The stent was then grasped with the flexible stent grasper, causing large  shards of the stone encrusted stent to fall into the bladder. It was removed and found to be complete. A 5-Togolese open ended catheter was then advanced over the wire to the level of the distal ureter. A retrograde pyelogram was performed demonstrating a normal distal ureter but complete obliteration without significant passage of contrast. The small amount of contrast that did pass demonstrated a significantly dilated collecting system. The wire was replaced and over it a 6 Fr x 26 cm stent was advanced with minimal resistance up to the renal pelvis. A good curl was observed in the renal pelvis on fluoro and under direct vision in the bladder.     We then evacuated the remaining pieces of stone out of the bladder and proceeding with a cystogram in a retrograde fashion. Cystogram demonstrated a normal bladder with an approximately 300 mL capacity. The bladder was then drained.     Patient tolerated the procedure well. No apparent complications. He was transported to the postanesthesia care unit in stable condition

## 2021-11-22 ENCOUNTER — TELEPHONE (OUTPATIENT)
Dept: UROLOGY | Facility: CLINIC | Age: 66
End: 2021-11-22
Payer: MEDICARE

## 2021-11-22 NOTE — TELEPHONE ENCOUNTER
Called Theodore today to explain RPG findings. Entire ureter is strictured. Ileal ureter would be only option. He is 244lbs and needs to get close to 200lbs before I will do ileal ureter. Discussed yo-yo voiding and detrusor failure. He understands. He will do stent changes locally and try to lose weight.

## (undated) DEVICE — RAD RX CONRAY 60% (50ML) CHARGE PER ML

## (undated) DEVICE — PACK CYSTO CUSTOM ASC

## (undated) RX ORDER — FENTANYL CITRATE 50 UG/ML
INJECTION, SOLUTION INTRAMUSCULAR; INTRAVENOUS
Status: DISPENSED
Start: 2021-11-19

## (undated) RX ORDER — GABAPENTIN 100 MG/1
CAPSULE ORAL
Status: DISPENSED
Start: 2021-11-19

## (undated) RX ORDER — ACETAMINOPHEN 325 MG/1
TABLET ORAL
Status: DISPENSED
Start: 2021-11-19

## (undated) RX ORDER — CEFAZOLIN SODIUM 2 G/50ML
SOLUTION INTRAVENOUS
Status: DISPENSED
Start: 2021-11-19